# Patient Record
Sex: FEMALE | Race: WHITE | Employment: OTHER | URBAN - METROPOLITAN AREA
[De-identification: names, ages, dates, MRNs, and addresses within clinical notes are randomized per-mention and may not be internally consistent; named-entity substitution may affect disease eponyms.]

---

## 2022-03-18 PROBLEM — E87.6 HYPOKALEMIA: Status: ACTIVE | Noted: 2021-08-03

## 2022-06-01 ENCOUNTER — HOSPITAL ENCOUNTER (OUTPATIENT)
Dept: CT IMAGING | Age: 72
Discharge: HOME | End: 2022-06-04
Payer: MEDICARE

## 2022-06-01 DIAGNOSIS — E27.8 ADRENAL NODULE (HCC): ICD-10-CM

## 2022-06-01 PROCEDURE — 74150 CT ABDOMEN W/O CONTRAST: CPT

## 2022-06-02 ENCOUNTER — INITIAL CONSULT (OUTPATIENT)
Dept: CARDIOLOGY CLINIC | Age: 72
End: 2022-06-02
Payer: MEDICARE

## 2022-06-02 VITALS
DIASTOLIC BLOOD PRESSURE: 82 MMHG | WEIGHT: 215.4 LBS | HEART RATE: 62 BPM | HEIGHT: 67 IN | BODY MASS INDEX: 33.81 KG/M2 | SYSTOLIC BLOOD PRESSURE: 138 MMHG

## 2022-06-02 DIAGNOSIS — R06.02 SHORTNESS OF BREATH ON EXERTION: ICD-10-CM

## 2022-06-02 DIAGNOSIS — R07.89 CHEST DISCOMFORT: Primary | ICD-10-CM

## 2022-06-02 DIAGNOSIS — R00.2 PALPITATIONS: ICD-10-CM

## 2022-06-02 PROCEDURE — 99204 OFFICE O/P NEW MOD 45 MIN: CPT | Performed by: INTERNAL MEDICINE

## 2022-06-02 PROCEDURE — 1123F ACP DISCUSS/DSCN MKR DOCD: CPT | Performed by: INTERNAL MEDICINE

## 2022-06-02 PROCEDURE — 93000 ELECTROCARDIOGRAM COMPLETE: CPT | Performed by: INTERNAL MEDICINE

## 2022-06-02 RX ORDER — IBUPROFEN 400 MG/1
400 TABLET ORAL 2 TIMES DAILY PRN
COMMUNITY

## 2022-06-02 RX ORDER — FLUOXETINE HYDROCHLORIDE 20 MG/1
20 CAPSULE ORAL DAILY
COMMUNITY

## 2022-06-02 ASSESSMENT — ENCOUNTER SYMPTOMS: SHORTNESS OF BREATH: 1

## 2022-06-02 NOTE — PROGRESS NOTES
[facility] CARDIOLOGY  [address] [facility], [address]  PHONE: [phone]      22    NAME:  Hasmukh Kumar  : 1950  MRN: [id number]         SUBJECTIVE:   Hasmukh Kumar is a 73 y.o. female seen for a consultation visit regarding the following:     Chief Complaint   Patient presents with    Chest Pain     referred by [facility] for chest pressure, palps & ShOB on exertion            HPI:  Consultation is requested by ASHLEY Cedeno CNP for evaluation of Chest Pain (referred by [facility] for chest pressure, palps & ShOB on exertion)   . Consult chest discomfort, dyspnea, palpitations. She has felt previously it is due to anxiety/stress but may occur at other times as well. Had some pressure in the chest yesterday. She begins by stating ~ 3 months ago she woke up \"completely disoriented\", a worker in her house at the time asked if she was on drugs because her pupils were small. She saw her PCP, was told they found a \"blip\" on her EKG that sounds like it was an ectopic beat. She has difficulty describing symptoms, noting they seem to be worse in the heat, she has to lie down more than she used to. Seems her biggest issue is dyspnea. Non smoker but years of secondhand exposure States she doesn't get much exercise but is planning to get a stationary bike, in large part because of environmental allergies that keep her indoors, will be getting that bike at the end of the month. Her BP is normally well controlled, she hasn't yet taken her morning meds            Past Medical History, Past Surgical History, Family history, Social History, and Medications were all reviewed with the patient today and updated as necessary. Prior to Admission medications    Medication Sig Start Date End Date Taking?  Authorizing Provider   ibuprofen (ADVIL;MOTRIN) 400 MG tablet Take 400 mg by mouth 2 times daily as needed for Pain   Yes Historical Provider, MD   FLUoxetine (PROZAC) 20 MG capsule Take 20 mg by mouth daily   Yes Historical Provider, MD   ZINC PO Take by mouth daily   Yes Historical Provider, MD   Cholecalciferol (VITAMIN D3 PO) Take by mouth daily   Yes Historical Provider, MD   amLODIPine (NORVASC) 10 MG tablet 1 tablet   Yes Ar Automatic Reconciliation   FLUoxetine (PROZAC) 40 MG capsule 1 capsule   Yes Ar Automatic Reconciliation   fluticasone (FLONASE) 50 MCG/ACT nasal spray 1 spray in each nostril   Yes Ar Automatic Reconciliation   hydroCHLOROthiazide (HYDRODIURIL) 25 MG tablet TAKE 1 TABLET BY MOUTH EVERY MORNING 8/1/21  Yes Ar Automatic Reconciliation   losartan (COZAAR) 100 MG tablet 1 tablet   Yes Ar Automatic Reconciliation   magnesium oxide (MAG-OX) 400 (240 Mg) MG tablet 1 tablet as needed   Yes Ar Automatic Reconciliation   potassium chloride (KLOR-CON M) 20 MEQ extended release tablet 1 tablet with food   Yes Ar Automatic Reconciliation   rosuvastatin (CRESTOR) 40 MG tablet 1 tablet   Yes Ar Automatic Reconciliation     Allergies   Allergen Reactions    Tetracyclines & Related Nausea Only    Codeine Nausea And Vomiting     Extreme nausea     Past Medical History:   Diagnosis Date    Adrenal nodule (HCC)     Anxiety and depression     Bilateral cataracts     Essential hypertension     Hyperlipidemia     Meningioma ([facility] [address]     Thyroid nodule      Past Surgical History:   Procedure Laterality Date    ABDOMINAL HERNIA REPAIR  2020    HEENT      sinus surgery    TONSILLECTOMY       Family History   Problem Relation Age of Onset    Breast Cancer Sister     Cancer Sister     Thyroid Cancer Sister      Social History     Tobacco Use    Smoking status: Never Smoker    Smokeless tobacco: Never Used    Tobacco comment: Years of secondhand smoke exposure   Substance Use Topics    Alcohol use: Yes     Comment: very rare       ROS:    Review of Systems   Constitutional: Positive for malaise/fatigue. Cardiovascular: Positive for chest pain and palpitations. Respiratory: Positive for shortness of breath. PHYSICAL EXAM:   /82   Pulse 62 Comment: via EKG report  Ht 5' 7\" (1.702 m)   Wt 215 lb 6.4 oz (97.7 kg)   BMI 33.74 kg/m²      Physical Exam  Constitutional:       Appearance: Normal appearance. HENT:      Head: Normocephalic and atraumatic. Eyes:      Conjunctiva/sclera: Conjunctivae normal.   Neck:      Vascular: No carotid bruit. Cardiovascular:      Rate and Rhythm: Normal rate and regular rhythm. Heart sounds: No murmur heard. No friction rub. No gallop. Pulmonary:      Effort: No respiratory distress. Breath sounds: No wheezing or rales. Abdominal:      General: Abdomen is flat. There is no distension. Palpations: Abdomen is soft. Tenderness: There is no abdominal tenderness. Musculoskeletal:         General: No swelling. Cervical back: Neck supple. Skin:     General: Skin is warm and dry. Neurological:      General: No focal deficit present. Mental Status: She is alert. Psychiatric:         Mood and Affect: Mood normal.         Behavior: Behavior normal.         Medical problems and test results were reviewed with the patient today. DATA REVIEW    BUN 27 Cr 1, otherwise normal CMP, TSH, MG, CBC, iron profile, vit D and B12    HDL 60 LDL 57    EKG    Sinus rhythm 62  normal axis, intervals, ST and T Waves  PRWP probably normal variant      CXR/IMAGING        DEVICE INTERROGATION        OUTSIDE RECORDS REVIEW    Records from outside providers have been reviewed and summarized as noted in the HPI, past history and data review sections of this note, and reviewed with patient. .       ASSESSMENT and [id number] was seen today for chest pain.     Diagnoses and all orders for this visit:    Chest pain  -     EKG 12 lead    Shortness of breath on exertion  -     EKG 12 lead  -     Exercise stress test; Future  -     Transthoracic echocardiogram (TTE) complete with contrast, bubble, strain, and 3D PRN; Future    Palpitations  -     EKG 12 lead          IMPRESSION:    Describes benign isolated ectopy. She's not terrifically bothered by it and does not desire medical therapy for that issues. Reviewed exacerbating/alleviating factors    Dyspnea with hypertension and dyslipidemia, both well controlled. Recommend echo and stress testing. She's working on developing an exercise regimen and healthier diet, congratulated ongoing efforts. Return for FOR ETT. Thank you for allowing me to participate in this patient's care. Please call or contact me if there are any questions or concerns regarding the above.       Gary Franco MD  06/02/22  9:04 AM

## 2022-06-02 NOTE — PATIENT INSTRUCTIONS
Patient Education        Premature Heartbeat: Care Instructions  Your Care Instructions     A premature heartbeat happens when the heart beats earlier than it should. This briefly interrupts the heart's rhythm. You do not usually feel the early heartbeat, and the next beat is stronger. To many people, this feels like a skipped heartbeat or a flutter. This heartbeat is also called a prematureventricular contraction (PVC). If you have no heart problems, premature heartbeats that happen once in a while are not a cause for concern. Most people have them at some time. They mayhappen more often if you drink a lot of caffeine or alcohol or are under stress. Usually, no cause for a premature heartbeat is found, and no treatment is needed. Some people may take medicine to prevent these heartbeats and torelieve symptoms. Follow-up care is a key part of your treatment and safety. Be sure to make and go to all appointments, and call your doctor if you are having problems. It's also a good idea to know your test results and keep alist of the medicines you take. How can you care for yourself at home?  Limit caffeine and other stimulants if they trigger premature heartbeats.  Reduce stress. Avoid people and places that make you feel anxious, if you can. Learn ways to reduce stress, such as biofeedback, guided imagery, and meditation.  Do not smoke or allow others to smoke around you. If you need help quitting, talk to your doctor about stop-smoking programs and medicines. These can increase your chances of quitting for good.  Get at least 30 minutes of exercise on most days of the week. Walking is a good choice. You also may want to do other activities, such as running, swimming, cycling, or playing tennis or team sports.  Eat heart-healthy foods.  Stay at a healthy weight. Lose weight if you need to.  Get enough sleep. Keep your room dark and quiet, and try to go to bed at the same time every night.    Limit hydrochlorothiazide       Last Written Prescription Date: 8/15/16  Last Fill Quantity: 90, # refills: 3  Last Office Visit with G, P or [facility] prescribing provider: 9/12/17       Potassium   Date Value Ref Range Status   08/16/2017 4.0 3.4 - 5.3 mmol/L Final     Creatinine   Date Value Ref Range Status   08/16/2017 0.74 0.52 - 1.04 mg/dL Final     BP Readings from Last 3 Encounters:   09/12/17 118/57   08/25/17 131/74   08/22/17 112/64        alcohol to 2 drinks a day for men and 1 drink a day for women. Too much alcohol can cause health problems. If drinking alcohol causes more premature heartbeats, do not drink it.  If your doctor prescribes medicine, take it exactly as prescribed. Call your doctor if you think you are having a problem with your medicine. When should you call for help? Call 911 anytime you think you may need emergency care. For example, call if:     You passed out (lost consciousness). Call your doctor now or seek immediate medical care if:     You are dizzy or lightheaded, or you feel like you may faint.      You are short of breath. Watch closely for changes in your health, and be sure to contact your doctor ifyou have any problems. Where can you learn more? Go to https://[employer] org and sign in to your "[employer] account. Enter N103 in the [employer] box to learn more about \"Premature Heartbeat: Care Instructions. \"     If you do not have an account, please click on the \"Sign Up Now\" link. Current as of: January 10, 2022               Content Version: 13.2  © 5309-4933 [employer]. Care instructions adapted under license by [facility] ([facility]). If you have questions about a medical condition or this instruction, always ask your healthcare professional. [address] any warranty or liability for your use of this information.